# Patient Record
Sex: MALE | Race: OTHER | URBAN - METROPOLITAN AREA
[De-identification: names, ages, dates, MRNs, and addresses within clinical notes are randomized per-mention and may not be internally consistent; named-entity substitution may affect disease eponyms.]

---

## 2018-02-16 ENCOUNTER — OFFICE VISIT (OUTPATIENT)
Dept: FAMILY MEDICINE CLINIC | Age: 33
End: 2018-02-16

## 2018-02-16 VITALS
TEMPERATURE: 98.5 F | BODY MASS INDEX: 29.7 KG/M2 | SYSTOLIC BLOOD PRESSURE: 110 MMHG | HEART RATE: 86 BPM | DIASTOLIC BLOOD PRESSURE: 78 MMHG | HEIGHT: 68 IN | WEIGHT: 196 LBS

## 2018-02-16 DIAGNOSIS — R10.13 DYSPEPSIA: ICD-10-CM

## 2018-02-16 DIAGNOSIS — R51.9 CHRONIC NONINTRACTABLE HEADACHE, UNSPECIFIED HEADACHE TYPE: Primary | ICD-10-CM

## 2018-02-16 DIAGNOSIS — G89.29 CHRONIC NONINTRACTABLE HEADACHE, UNSPECIFIED HEADACHE TYPE: Primary | ICD-10-CM

## 2018-02-16 RX ORDER — RANITIDINE 150 MG/1
150 TABLET, FILM COATED ORAL 2 TIMES DAILY
Qty: 60 TAB | Refills: 3 | Status: SHIPPED | OUTPATIENT
Start: 2018-02-16

## 2018-02-16 NOTE — PROGRESS NOTES
Subjective:     Chief Complaint   Patient presents with    Headache     pain is behind the eyes        He  is a 33 y.o. male who presents for evaluation of chronic h/a. Occur usually 3x week. Can last all day if he does not take OTC analgesics. Onset approx 1-2 years ago. Relieved w/ Excedrin migraine. No associated blurry vision/vision changes. Pt notes + Hx of snoring. Has eye exam 3 years ago and was recommend to wear glasses. Lost the glasses and hasn't been for repeat eye exam.     Also c/o of dyspepsia and sensation of food getting stuck in his stomach/lower esophagus. Worse after meals and in AM upon awakening. No assoc N/V/D nor hematochezia. No attempted remedies. Onset approx 3-6 months ago. ROS  Gen - no fever/chills  Resp - no dyspnea or cough  CV - no chest pain or GRIFFITHS  Rest per HPI    No past medical history on file. No past surgical history on file. Current Outpatient Prescriptions on File Prior to Visit   Medication Sig Dispense Refill    aspirin-acetaminophen-caffeine 250-250-65 mg per tablet Take 1 Tab by mouth every six (6) hours as needed for Pain. No current facility-administered medications on file prior to visit.          Objective:     Vitals:    02/16/18 1024   BP: 110/78   Pulse: 86   Temp: 98.5 °F (36.9 °C)   TempSrc: Oral   Weight: 196 lb (88.9 kg)   Height: 5' 8\" (1.727 m)       Physical Examination:  General appearance - alert, well appearing, and in no distress  Eyes -sclera anicteric  Neck - supple, no significant adenopathy, no thyromegaly  Chest - clear to auscultation, no wheezes, rales or rhonchi, symmetric air entry  Heart - normal rate, regular rhythm, normal S1, S2, no murmurs, rubs, clicks or gallops  Neurological - alert, oriented, no focal findings or movement disorder noted  Abdomen-BS present/WNL x 4 quads, non-tender/distended, soft,no organomegaly    Assessment/ Plan:   Diagnoses and all orders for this visit: 1. Chronic nonintractable headache, unspecified headache type    2. Dyspepsia  -     raNITIdine (ZANTAC) 150 mg tablet; Take 1 Tab by mouth two (2) times a day. Suspect Pt's eye and h/a are r/t need for eyeglasses given his Hx. Re-refer for dilated eye exam and recommend Pt see how S&S change after several weeks of wearing glasses. Cont OTC exedrin migraine. Zantac BID x 3-4 week then PRN for dyspepsia like S&S. If no improvement, may consider H pylori stool AB testing and checking baseline labs. Re-eval in 6-8 weeks. I have discussed the diagnosis with the patient and the intended plan as seen in the above orders. The patient has received an after-visit summary and questions were answered concerning future plans. I have discussed medication side effects and warnings with the patient as well. The patient verbalizes understanding and agreement with the plan.     Follow-up Disposition: Not on File

## 2018-02-16 NOTE — PROGRESS NOTES
Coordination of Care  1. Have you been to the ER, urgent care clinic since your last visit? Hospitalized since your last visit? No    2. Have you seen or consulted any other health care providers outside of the [address] [place] [address] since your last visit? Include any pap smears or colon screening. No    Medications  Does the patient need refills? N/A    Learning Assessment Complete?  yes

## 2018-02-16 NOTE — PATIENT INSTRUCTIONS

## 2018-02-16 NOTE — MR AVS SNAPSHOT
[address] [address] 
[phone] Patient: Marci Thompson MRN: [id number] SDS:7[birth year]39[birth year] Visit Information Date & Time Provider Department Dept. Phone Encounter #  
 2/16/2018 10:30 AM Fadia Zaldivar NP [facility] [phone] [id number] Follow-up Instructions Return in about 8 weeks (around 4/13/2018). Upcoming Health Maintenance Date Due DTaP/Tdap/Td series (1 - Tdap) 8/19/2007 Influenza Age 5 to Adult 8/1/2017 Allergies as of 2/16/2018  Review Complete On: 2/16/2018 By: Fadia Zaldivar NP Severity Noted Reaction Type Reactions Seafood  06/25/2015    Rash, Itching Current Immunizations  Never Reviewed No immunizations on file. Not reviewed this visit You Were Diagnosed With   
  
 Codes Comments Chronic nonintractable headache, unspecified headache type    -  Primary ICD-10-CM: R51 ICD-9-CM: 600. 0 Dyspepsia     ICD-10-CM: R10.13 ICD-9-CM: 536.8 Vitals BP Pulse Temp Height(growth percentile) Weight(growth percentile) BMI  
 110/78 (BP 1 Location: Left arm, BP Patient Position: Sitting) 86 98.5 °F (36.9 °C) (Oral) 5' 8\" (1.727 m) 196 lb (88.9 kg) 29.8 kg/m2 Smoking Status Never Smoker Vitals History BMI and BSA Data Body Mass Index Body Surface Area  
 29.8 kg/m 2 2.07 m 2 [facility] Name Phone [facility] [address] [facility], [place] [address] [facility] [place] [facility] [phone] Your Updated Medication List  
  
   
This list is accurate as of: 2/16/18 11:20 AM.  Always use your most recent med list.  
  
  
  
  
 aspirin-acetaminophen-caffeine 250-250-65 mg per tablet Commonly known as:  EXCEDRIN ES Take 1 Tab by mouth every six (6) hours as needed for Pain. raNITIdine 150 mg tablet Commonly known as:  ZANTAC Take 1 Tab by mouth two (2) times a day. Daniel Paz Refills  
 raNITIdine (ZANTAC) 150 mg tablet 3 Sig: Take 1 Tab by mouth two (2) times a day. Class: Normal  
 Pharmacy: [address] [facility], [address] [place] [address] #: [phone] Route: Oral  
  
Follow-up Instructions Return in about 8 weeks (around 4/13/2018). Patient Instructions Headache: Care Instructions Your Care Instructions Headaches have many possible causes. Most headaches aren't a sign of a more serious problem, and they will get better on their own. Home treatment may help you feel better faster. The doctor has checked you carefully, but problems can develop later. If you notice any problems or new symptoms, get medical treatment right away. Follow-up care is a key part of your treatment and safety. Be sure to make and go to all appointments, and call your doctor if you are having problems. It's also a good idea to know your test results and keep a list of the medicines you take. How can you care for yourself at home? · Do not drive if you have taken a prescription pain medicine. · Rest in a quiet, dark room until your headache is gone. Close your eyes and try to relax or go to sleep. Don't watch TV or read. · Put a cold, moist cloth or cold pack on the painful area for 10 to 20 minutes at a time. Put a thin cloth between the cold pack and your skin. · Use a warm, moist towel or a heating pad set on low to relax tight shoulder and neck muscles. · Have someone gently massage your neck and shoulders. · Take pain medicines exactly as directed. ¨ If the doctor gave you a prescription medicine for pain, take it as prescribed. ¨ If you are not taking a prescription pain medicine, ask your doctor if you can take an over-the-counter medicine. · Be careful not to take pain medicine more often than the instructions allow, because you may get worse or more frequent headaches when the medicine wears off. · Do not ignore new symptoms that occur with a headache, such as a fever, weakness or numbness, vision changes, or confusion. These may be signs of a more serious problem. To prevent headaches · Keep a headache diary so you can figure out what triggers your headaches. Avoiding triggers may help you prevent headaches. Record when each headache began, how long it lasted, and what the pain was like (throbbing, aching, stabbing, or dull). Write down any other symptoms you had with the headache, such as nausea, flashing lights or dark spots, or sensitivity to bright light or loud noise. Note if the headache occurred near your period. List anything that might have triggered the headache, such as certain foods (chocolate, cheese, wine) or odors, smoke, bright light, stress, or lack of sleep. · Find healthy ways to deal with stress. Headaches are most common during or right after stressful times. Take time to relax before and after you do something that has caused a headache in the past. 
· Try to keep your muscles relaxed by keeping good posture. Check your jaw, face, neck, and shoulder muscles for tension, and try relaxing them. When sitting at a desk, change positions often, and stretch for 30 seconds each hour. · Get plenty of sleep and exercise. · Eat regularly and well. Long periods without food can trigger a headache. · Treat yourself to a massage. Some people find that regular massages are very helpful in relieving tension. · Limit caffeine by not drinking too much coffee, tea, or soda. But don't quit caffeine suddenly, because that can also give you headaches. · Reduce eyestrain from computers by blinking frequently and looking away from the computer screen every so often. Make sure you have proper eyewear and that your monitor is set up properly, about an arm's length away. · Seek help if you have depression or anxiety.  Your headaches may be linked to these conditions. Treatment can both prevent headaches and help with symptoms of anxiety or depression. When should you call for help? Call 911 anytime you think you may need emergency care. For example, call if: 
? · You have signs of a stroke. These may include: 
¨ Sudden numbness, paralysis, or weakness in your face, arm, or leg, especially on only one side of your body. ¨ Sudden vision changes. ¨ Sudden trouble speaking. ¨ Sudden confusion or trouble understanding simple statements. ¨ Sudden problems with walking or balance. ¨ A sudden, severe headache that is different from past headaches. ?Call your doctor now or seek immediate medical care if: 
? · You have a new or worse headache. ? · Your headache gets much worse. Where can you learn more? Go to http://waldemar-joel.info/. Enter M271 in the search box to learn more about \"Headache: Care Instructions. \" Current as of: October 14, 2016 Content Version: 11.4 © 3416-0807 [employer]. Care instructions adapted under license by [employer] (which disclaims liability or warranty for this information). If you have questions about a medical condition or this instruction, always ask your healthcare professional. [place] [address] any warranty or liability for your use of this information. Introducing [facility]! [facility] introduce portal paciente Zuldi . Ahora se puede acceder a partes de lim expediente médico, enviar por correo electrónico la oficina de lim médico y solicitar renovaciones de medicamentos en línea. En lim navegador de Internet , Aleta Ormond a [email or web address] Nabor clic en el usuario por Manjinder James? Nithin Velez clic aquí en la sesión [facility]. Verá la página de registro [place]. Ingrese lim código de [facility] Norma shaun y alexandru aparece a continuación.  Usted no tendrá que [employer] código después de valentin completado el proceso de registro . Si usted no se inscribe antes de la fecha de caducidad , debe solicitar un nuevo código. · MyChart Código de acceso : [id number] Expires: 5/17/2018 11:19 AM 
 
Ingresa los últimos cuatro dígitos de lim Número de Seguro Social ( xxxx ) y fecha de nacimiento ( dd / mm / aaaa ) alexandru se indica y nabor clic en Enviar. Usted será llevado a la siguiente página de registro . Crear un ID MyChart . Esta será lim ID de inicio de sesión de MyChart y no puede ser [place] , por lo que pensar en lynette que es [place] y fácil de recordar . Crear lynette contraseña MyChart . Usted puede cambiar lim contraseña en cualquier momento . Ingrese lim Password Reset de preguntas y [place] . [place] se puede utilizar en un momento posterior si usted olvida lim contraseña. Introduzca lim dirección de correo electrónico . Jennifer Salazar recibirá lynette notificación por correo electrónico cuando la nueva información está disponible en MyChart . Lossie Garcia clic en Registrarse. Kerrick Lima bishnu y descargar porciones de lim expediente médico. 
Nabor clic en el enlace de descarga del menú Resumen para descargar lynette copia portátil de lim información médica . Si tiene [employer] , por favor visite la sección de preguntas frecuentes del sitio web MyChart . Recuerde, MyChart NO es que se utilizará para las necesidades urgentes. Para emergencias médicas , llame al 911 . Ahora disponible en lim iPhone y Android ! Por favor proporcione sigrid resumen de la documentación de cuidado a lim próximo proveedor. Your primary care clinician is listed as Star Ballesteros. If you have any questions after today's visit, please call [phone].